# Patient Record
(demographics unavailable — no encounter records)

---

## 2024-11-11 NOTE — DISCUSSION/SUMMARY
[FreeTextEntry1] : Mr. CEDRIC HORTON is a very pleasant 98 year man with a past medical history of HFpEF, AFib, bradycardia presenting for evaluation.   #Acute on chronic combined systolic and diastolic heart failure: NYHA I, Profile A, EF 45% Etiology: NICM based on nuclear stress Beta blocker: bradycardic ACE/ARNI: Entresto 24/26.  MRA: stopped aldactone due to hypotension, now BP improved SGLT2 inhibitor: continue farxiaga Diuretic: DECREASE lasix to 20 mg daily NST without ischemia  #AFib: on eliquis - continue eliquis - rate controlled - discussed bleeding and fall risk. Per son patient is walking well  RTC in 3 months, will repeat echocardiogram [EKG obtained to assist in diagnosis and management of assessed problem(s)] : EKG obtained to assist in diagnosis and management of assessed problem(s)

## 2024-11-11 NOTE — REASON FOR VISIT
[FreeTextEntry1] : 97 yo man with a history of HFpEF presents for evaluation. Usually gets care in [place] where he has his PCP and a cardiologist. [place]. Last month had GIL and was hospitalized at [facility]. Echocardiogram showed depressed LV function 45-50%, which was new for him. He and family did not pursue invasive strategy for ischemic evaluation. He was started on entresto which he is taking with ease. Wants to pursue a less invasive strategy.  ECG shows LBBB, A fib  BP low, 88/60 EF 45% on echocardiogram performed.   7/2024 Presents with son today. Feels well overall and has no complaints. Denies chest pains or shortness of breath.  Furosemide was increased to 40 mg daily by PCP BP well controlled Nuclear stress last month showed no evidence of ischemia or TID Denies orthopnea or PND  11/2024 Presents with his son. Lasix has been very uncomfortable for him. He has been urinating too much. Denies dizziness or lightheadedness.  BP well controlled 116/52.  Otherwise, denies orthopnea, paroxysmal nocturnal dyspnea, lower extremity edema, unexplained weight gain or dyspnea on exertion.